# Patient Record
Sex: FEMALE | Race: WHITE | NOT HISPANIC OR LATINO | URBAN - METROPOLITAN AREA
[De-identification: names, ages, dates, MRNs, and addresses within clinical notes are randomized per-mention and may not be internally consistent; named-entity substitution may affect disease eponyms.]

---

## 2023-01-16 ENCOUNTER — OFFICE VISIT (OUTPATIENT)
Dept: URGENT CARE | Facility: CLINIC | Age: 43
End: 2023-01-16
Payer: COMMERCIAL

## 2023-01-16 VITALS
HEIGHT: 60 IN | RESPIRATION RATE: 16 BRPM | BODY MASS INDEX: 23.56 KG/M2 | OXYGEN SATURATION: 100 % | DIASTOLIC BLOOD PRESSURE: 58 MMHG | HEART RATE: 96 BPM | WEIGHT: 120 LBS | TEMPERATURE: 99 F | SYSTOLIC BLOOD PRESSURE: 98 MMHG

## 2023-01-16 DIAGNOSIS — J02.9 SORE THROAT: ICD-10-CM

## 2023-01-16 DIAGNOSIS — J02.0 STREP PHARYNGITIS: Primary | ICD-10-CM

## 2023-01-16 LAB
CTP QC/QA: YES
CTP QC/QA: YES
MOLECULAR STREP A: POSITIVE
POC MOLECULAR INFLUENZA A AGN: NEGATIVE
POC MOLECULAR INFLUENZA B AGN: NEGATIVE

## 2023-01-16 PROCEDURE — 87651 POCT STREP A MOLECULAR: ICD-10-PCS | Mod: QW,S$GLB,, | Performed by: PHYSICIAN ASSISTANT

## 2023-01-16 PROCEDURE — 1160F RVW MEDS BY RX/DR IN RCRD: CPT | Mod: CPTII,S$GLB,, | Performed by: PHYSICIAN ASSISTANT

## 2023-01-16 PROCEDURE — 3074F PR MOST RECENT SYSTOLIC BLOOD PRESSURE < 130 MM HG: ICD-10-PCS | Mod: CPTII,S$GLB,, | Performed by: PHYSICIAN ASSISTANT

## 2023-01-16 PROCEDURE — 3078F PR MOST RECENT DIASTOLIC BLOOD PRESSURE < 80 MM HG: ICD-10-PCS | Mod: CPTII,S$GLB,, | Performed by: PHYSICIAN ASSISTANT

## 2023-01-16 PROCEDURE — 1159F PR MEDICATION LIST DOCUMENTED IN MEDICAL RECORD: ICD-10-PCS | Mod: CPTII,S$GLB,, | Performed by: PHYSICIAN ASSISTANT

## 2023-01-16 PROCEDURE — 87502 POCT INFLUENZA A/B MOLECULAR: ICD-10-PCS | Mod: QW,S$GLB,, | Performed by: PHYSICIAN ASSISTANT

## 2023-01-16 PROCEDURE — 3008F BODY MASS INDEX DOCD: CPT | Mod: CPTII,S$GLB,, | Performed by: PHYSICIAN ASSISTANT

## 2023-01-16 PROCEDURE — 3008F PR BODY MASS INDEX (BMI) DOCUMENTED: ICD-10-PCS | Mod: CPTII,S$GLB,, | Performed by: PHYSICIAN ASSISTANT

## 2023-01-16 PROCEDURE — 1159F MED LIST DOCD IN RCRD: CPT | Mod: CPTII,S$GLB,, | Performed by: PHYSICIAN ASSISTANT

## 2023-01-16 PROCEDURE — 99203 OFFICE O/P NEW LOW 30 MIN: CPT | Mod: S$GLB,,, | Performed by: PHYSICIAN ASSISTANT

## 2023-01-16 PROCEDURE — 87651 STREP A DNA AMP PROBE: CPT | Mod: QW,S$GLB,, | Performed by: PHYSICIAN ASSISTANT

## 2023-01-16 PROCEDURE — 3078F DIAST BP <80 MM HG: CPT | Mod: CPTII,S$GLB,, | Performed by: PHYSICIAN ASSISTANT

## 2023-01-16 PROCEDURE — 99203 PR OFFICE/OUTPT VISIT, NEW, LEVL III, 30-44 MIN: ICD-10-PCS | Mod: S$GLB,,, | Performed by: PHYSICIAN ASSISTANT

## 2023-01-16 PROCEDURE — 87502 INFLUENZA DNA AMP PROBE: CPT | Mod: QW,S$GLB,, | Performed by: PHYSICIAN ASSISTANT

## 2023-01-16 PROCEDURE — 3074F SYST BP LT 130 MM HG: CPT | Mod: CPTII,S$GLB,, | Performed by: PHYSICIAN ASSISTANT

## 2023-01-16 PROCEDURE — 1160F PR REVIEW ALL MEDS BY PRESCRIBER/CLIN PHARMACIST DOCUMENTED: ICD-10-PCS | Mod: CPTII,S$GLB,, | Performed by: PHYSICIAN ASSISTANT

## 2023-01-16 RX ORDER — AMOXICILLIN 500 MG/1
500 CAPSULE ORAL EVERY 12 HOURS
Qty: 20 CAPSULE | Refills: 0 | Status: SHIPPED | OUTPATIENT
Start: 2023-01-16 | End: 2023-01-26

## 2023-01-16 RX ORDER — CITALOPRAM 20 MG/1
20 TABLET, FILM COATED ORAL
COMMUNITY
Start: 2023-01-06

## 2023-01-16 RX ORDER — NORETHINDRONE ACETATE/ETHINYL ESTRADIOL AND FERROUS FUMARATE 1MG-20(21)
1 KIT ORAL
COMMUNITY
Start: 2022-12-29

## 2023-01-16 NOTE — PROGRESS NOTES
Subjective:       Patient ID: Erlinda Germain is a 42 y.o. female.    Vitals:  height is 5' (1.524 m) and weight is 54.4 kg (120 lb). Her oral temperature is 98.5 °F (36.9 °C). Her blood pressure is 98/58 (abnormal) and her pulse is 96. Her respiration is 16 and oxygen saturation is 100%.     Chief Complaint: Cough    Patient presents today with cough, sore throat and body aches that began a couple of days ago.  Patient took a Covid test within the last hour and was negative.  Patient has taken ibuprofen for symptoms.      Cough  This is a new problem. The current episode started in the past 7 days. Associated symptoms include chills, postnasal drip (minimal), a sore throat and sweats. Associated symptoms comments: Body aches. Treatments tried: ibuprofen.     Constitution: Positive for chills.   HENT:  Positive for postnasal drip (minimal) and sore throat.    Respiratory:  Negative for cough.      Objective:      Physical Exam   Constitutional: She does not appear ill. No distress.   HENT:   Head: Normocephalic and atraumatic.   Ears:   Right Ear: Tympanic membrane, external ear and ear canal normal.   Left Ear: Tympanic membrane, external ear and ear canal normal.   Mouth/Throat: Mucous membranes are moist. Oropharyngeal exudate and posterior oropharyngeal erythema present.   Eyes: Conjunctivae are normal. Right eye exhibits no discharge. Left eye exhibits no discharge. Extraocular movement intact   Neck: Neck supple.   Cardiovascular: Normal rate, regular rhythm and normal heart sounds.   No murmur heard.  Pulmonary/Chest: Effort normal and breath sounds normal. She has no wheezes. She has no rhonchi. She has no rales.   Abdominal: Normal appearance.   Musculoskeletal: Normal range of motion.         General: Normal range of motion.   Lymphadenopathy:     She has cervical adenopathy.   Neurological: no focal deficit. She is alert.   Skin: Skin is warm, dry and not pale. jaundice  Psychiatric: Her behavior is  normal. Mood, judgment and thought content normal.   Nursing note and vitals reviewed.      Assessment:       1. Strep pharyngitis    2. Sore throat          Plan:         Strep pharyngitis    Sore throat  -     POCT Influenza A/B MOLECULAR  -     POCT Strep A, Molecular    Results for orders placed or performed in visit on 01/16/23   POCT Influenza A/B MOLECULAR   Result Value Ref Range    POC Molecular Influenza A Ag Negative Negative, Not Reported    POC Molecular Influenza B Ag Negative Negative, Not Reported     Acceptable Yes    POCT Strep A, Molecular   Result Value Ref Range    Molecular Strep A, POC Positive (A) Negative     Acceptable Yes         Other orders  -     amoxicillin (AMOXIL) 500 MG capsule; Take 1 capsule (500 mg total) by mouth every 12 (twelve) hours. for 10 days  Dispense: 20 capsule; Refill: 0    Strep Throat Discharge Instructions   About this topic   Strep throat is an infection of the throat caused by germs called group A streptococci. Strep throat is not the same as just a sore throat. It may be much worse. With strep throat, your doctor may need to treat the infection with drugs. You may start to feel better 1 to 2 days after you start your drugs.  The doctor may look for the signs and may do tests like a throat swab to see if you have this illness.     What care is needed at home?   Ask your doctor what you need to do when you go home. Make sure you ask questions if you do not understand what the doctor says. This way you will know what you need to do.  Gargle with warm salt water a few times daily. Mix 1/2 teaspoon (2.5 grams) salt with a cup (240 mL) of warm water.  Use a cool mist humidifier to keep your throat moist.  Drink lots of water, juice, or broth.  Suck on ice chips or throat lozenges to ease pain.  Stop smoking. Talk to your doctor if you need help quitting. Stay away from those who are smoking.  What follow-up care is needed?   Your doctor  may ask you to make visits to the office to check on your progress. Be sure to keep these visits.  What drugs may be needed?   The doctor may order drugs to:  Help with pain and swelling  Fight an infection  Will physical activity be limited?   You may need to rest at home for 1 to 2 days or until you are feeling well.  Stay home from work, school, or  until you no longer have a fever AND have taken antibiotics for 24 hours.  What changes to diet are needed?   If your throat feels too sore to eat solid foods, you may drink juice, milk, milkshakes, or soups.  Do not drink sports drinks, soft drinks, undiluted fruit juice, or beverages that have too much sugar. These may cause fluid loss and throat itchiness.  Avoid caffeine, acidic juices like orange juice or lemonade, and beer, wine, and mixed drinks (alcohol). These can worsen your signs.  What problems could happen?   Kidney damage  Rheumatic fever  Heart problems  Ear infection  Tonsillitis  What can be done to prevent this health problem?   Strep throat is very contagious. Wash your hands often with soap and water for at least 20 seconds, especially after coughing or sneezing. Alcohol-based hand sanitizers also work to kill the germs.  If you are sick, cover your mouth and nose with tissue when you cough or sneeze. You can also cough into your elbow. Throw away tissues in the trash and wash your hands after touching used tissues.  Do not share cups or eating utensils with others, especially while you are sick.  Do not get too close (kissing, hugging) to people who are sick.  Do not share towels or hankies with anyone who is sick.  Stay away from crowded places.  When do I need to call the doctor?   Signs of infection. These include a fever of 100.4°F (38°C) or higher, chills, very bad sore throat, ear or sinus pain, cough, more sputum or change in color of sputum.  Fast heartbeat  Very tired  Trouble drinking and eating soups and soft foods  Changes in  the color of your urine  Teach Back: Helping You Understand   The Teach Back Method helps you understand the information we are giving you. After you talk with the staff, tell them in your own words what you learned. This helps to make sure the staff has described each thing clearly. It also helps to explain things that may have been confusing. Before going home, make sure you can do these:  I can tell you about my condition.  I can tell you what may help ease my pain.  I can tell you what I can do to help avoid passing the infection to others.  I can tell you what I will do if I have trouble drinking or I am not feeling better in a week.  Where can I learn more?   [employer]  https://familydoctor.org/condition/strep-throat/   Centers for Disease Control  https://www.cdc.gov/groupastrep/diseases-public/strep-throat.html   [employer]  http://kidshealth.org/parent/infections/lung/strep_throat.html   Last Reviewed Date   2020-03-27  Consumer Information Use and Disclaimer   This information is not specific medical advice and does not replace information you receive from your health care provider. This is only a brief summary of general information. It does NOT include all information about conditions, illnesses, injuries, tests, procedures, treatments, therapies, discharge instructions or life-style choices that may apply to you. You must talk with your health care provider for complete information about your health and treatment options. This information should not be used to decide whether or not to accept your health care providers advice, instructions or recommendations. Only your health care provider has the knowledge and training to provide advice that is right for you.  Copyright   Copyright © 2021 [employer] and its affiliates and/or licensors. All rights reserved.

## 2023-01-16 NOTE — LETTER
January 16, 2023      [facility] [address] [address], [address]  [place] [address]  Phone: [phone]  Fax: [phone]       Patient: Erlinda Germain   YOB: 1980  Date of Visit: 01/16/2023    To Whom It May Concern:    Ana Germain  was at [facility] on 01/16/2023. The patient may return to work/school on 1/17/2023 with no restrictions. If you have any questions or concerns, or if I can be of further assistance, please do not hesitate to contact me.    Sincerely,    MIR Meléndez